# Patient Record
Sex: FEMALE | Race: WHITE | NOT HISPANIC OR LATINO | URBAN - METROPOLITAN AREA
[De-identification: names, ages, dates, MRNs, and addresses within clinical notes are randomized per-mention and may not be internally consistent; named-entity substitution may affect disease eponyms.]

---

## 2018-05-02 ENCOUNTER — EMERGENCY (EMERGENCY)
Facility: HOSPITAL | Age: 73
LOS: 1 days | Discharge: HOME | End: 2018-05-02
Attending: EMERGENCY MEDICINE
Payer: COMMERCIAL

## 2018-05-02 VITALS
SYSTOLIC BLOOD PRESSURE: 167 MMHG | RESPIRATION RATE: 22 BRPM | HEART RATE: 98 BPM | TEMPERATURE: 98 F | DIASTOLIC BLOOD PRESSURE: 79 MMHG | OXYGEN SATURATION: 100 % | WEIGHT: 139.99 LBS

## 2018-05-02 PROCEDURE — 99291 CRITICAL CARE FIRST HOUR: CPT

## 2018-05-02 PROCEDURE — 70496 CT ANGIOGRAPHY HEAD: CPT | Mod: 26

## 2018-05-02 PROCEDURE — 70498 CT ANGIOGRAPHY NECK: CPT | Mod: 26

## 2018-05-02 PROCEDURE — 70450 CT HEAD/BRAIN W/O DYE: CPT | Mod: 26,59

## 2018-05-02 RX ORDER — KETOROLAC TROMETHAMINE 30 MG/ML
15 SYRINGE (ML) INJECTION ONCE
Qty: 0 | Refills: 0 | Status: DISCONTINUED | OUTPATIENT
Start: 2018-05-02 | End: 2018-05-02

## 2018-05-02 RX ORDER — SODIUM CHLORIDE 9 MG/ML
1000 INJECTION INTRAMUSCULAR; INTRAVENOUS; SUBCUTANEOUS ONCE
Qty: 0 | Refills: 0 | Status: COMPLETED | OUTPATIENT
Start: 2018-05-02 | End: 2018-05-02

## 2018-05-02 RX ORDER — MORPHINE SULFATE 50 MG/1
4 CAPSULE, EXTENDED RELEASE ORAL ONCE
Qty: 0 | Refills: 0 | Status: DISCONTINUED | OUTPATIENT
Start: 2018-05-02 | End: 2018-05-02

## 2018-05-02 RX ORDER — ONDANSETRON 8 MG/1
4 TABLET, FILM COATED ORAL ONCE
Qty: 0 | Refills: 0 | Status: COMPLETED | OUTPATIENT
Start: 2018-05-02 | End: 2018-05-02

## 2018-05-02 RX ADMIN — ONDANSETRON 4 MILLIGRAM(S): 8 TABLET, FILM COATED ORAL at 23:52

## 2018-05-02 RX ADMIN — MORPHINE SULFATE 4 MILLIGRAM(S): 50 CAPSULE, EXTENDED RELEASE ORAL at 23:53

## 2018-05-02 NOTE — ED PROVIDER NOTE - OBJECTIVE STATEMENT
71 y/o F w/ hx of migraine HA p/w HAs today, consistent w/ prior migrane HAs, had multiple auros, had dysarthria, which resolved, associated w/ nausea.

## 2018-05-02 NOTE — ED PROVIDER NOTE - CRITICAL CARE PROVIDED
consultation with other physicians/additional history taking/documentation/interpretation of diagnostic studies/direct patient care (not related to procedure)

## 2018-05-02 NOTE — ED PROVIDER NOTE - ATTENDING CONTRIBUTION TO CARE
73 yo F presents with headache for the past 4-5 hours with slurred speech that started while in the ER. + expressive aphasia and dysarthria at this time. L frontal headache, 10/10 at this time. patient unable to describe if this was sudden onset or gradual onset. no neck pain or stiffness. no extremity weakness. patient has a h/o migraine and states that this is similar to her migraine headache, but this is the worst of her life. she reports an aura prior to migraine starting.  PE appears in distress secondary to pain. full rom of neck. + dysarthria, word finding difficulties with expressive aphasia. neuro exam otherwise normal.   code stroke called on pt arrival. 71 yo F presents with headache for the past 4-5 hours with slurred speech that started while in the ER. + expressive aphasia and dysarthria at this time. L frontal headache, 10/10 at this time. patient unable to describe if this was sudden onset or gradual onset. no neck pain or stiffness. no extremity weakness. patient has a h/o migraine and states that this is similar to her migraine headache, but this is the worst of her life. she reports an aura prior to headache starting today.  PE appears in distress secondary to pain. full rom of neck. + dysarthria, word finding difficulties with expressive aphasia. neuro exam otherwise normal.   code stroke called on pt arrival.  ed workup shows CTH with no acute infarct/bleed. 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  No intradural aneurysm  labs with hyponatremia 127 and hypokalemia 3.0. K repleted. patient treated with IVFs and pain meds in the Er with significant improvement of her headache. she remained neuro intact while in the ER. neuro recommended MRI brain to r/o migrainous infarct. patient transferred to CDU for further testing and tx

## 2018-05-02 NOTE — ED ADULT TRIAGE NOTE - NS ED TRIAGE CLINICAL UPGRADE PROVIDER FT
2320-Pts  came over to staff stating that pt was having sudden onset difficulty speaking/finding words, pt wheeled back to  and evaluated by MD Bolton who called code stroke at 2327

## 2018-05-03 VITALS
DIASTOLIC BLOOD PRESSURE: 67 MMHG | RESPIRATION RATE: 17 BRPM | OXYGEN SATURATION: 98 % | TEMPERATURE: 98 F | HEART RATE: 88 BPM | SYSTOLIC BLOOD PRESSURE: 109 MMHG

## 2018-05-03 LAB
ALBUMIN SERPL ELPH-MCNC: 4.4 G/DL — SIGNIFICANT CHANGE UP (ref 3.3–5)
ALBUMIN SERPL ELPH-MCNC: 4.7 G/DL — SIGNIFICANT CHANGE UP (ref 3.3–5)
ALP SERPL-CCNC: 61 U/L — SIGNIFICANT CHANGE UP (ref 40–120)
ALP SERPL-CCNC: 62 U/L — SIGNIFICANT CHANGE UP (ref 40–120)
ALT FLD-CCNC: 19 U/L — SIGNIFICANT CHANGE UP (ref 10–45)
ALT FLD-CCNC: 20 U/L — SIGNIFICANT CHANGE UP (ref 10–45)
ANION GAP SERPL CALC-SCNC: 11 MMOL/L — SIGNIFICANT CHANGE UP (ref 5–17)
ANION GAP SERPL CALC-SCNC: 13 MMOL/L — SIGNIFICANT CHANGE UP (ref 5–17)
ANION GAP SERPL CALC-SCNC: 20 MMOL/L — HIGH (ref 5–17)
APTT BLD: 23.8 SEC — LOW (ref 27.5–37.4)
AST SERPL-CCNC: 30 U/L — SIGNIFICANT CHANGE UP (ref 10–40)
AST SERPL-CCNC: 32 U/L — SIGNIFICANT CHANGE UP (ref 10–40)
BASOPHILS # BLD AUTO: 0.1 K/UL — SIGNIFICANT CHANGE UP (ref 0–0.2)
BILIRUB SERPL-MCNC: 0.3 MG/DL — SIGNIFICANT CHANGE UP (ref 0.2–1.2)
BILIRUB SERPL-MCNC: 0.3 MG/DL — SIGNIFICANT CHANGE UP (ref 0.2–1.2)
BUN SERPL-MCNC: 12 MG/DL — SIGNIFICANT CHANGE UP (ref 7–23)
BUN SERPL-MCNC: 8 MG/DL — SIGNIFICANT CHANGE UP (ref 7–23)
BUN SERPL-MCNC: 9 MG/DL — SIGNIFICANT CHANGE UP (ref 7–23)
CALCIUM SERPL-MCNC: 9.2 MG/DL — SIGNIFICANT CHANGE UP (ref 8.4–10.5)
CALCIUM SERPL-MCNC: 9.5 MG/DL — SIGNIFICANT CHANGE UP (ref 8.4–10.5)
CALCIUM SERPL-MCNC: 9.6 MG/DL — SIGNIFICANT CHANGE UP (ref 8.4–10.5)
CHLORIDE SERPL-SCNC: 88 MMOL/L — LOW (ref 96–108)
CHLORIDE SERPL-SCNC: 93 MMOL/L — LOW (ref 96–108)
CHLORIDE SERPL-SCNC: 95 MMOL/L — LOW (ref 96–108)
CO2 SERPL-SCNC: 19 MMOL/L — LOW (ref 22–31)
CO2 SERPL-SCNC: 23 MMOL/L — SIGNIFICANT CHANGE UP (ref 22–31)
CO2 SERPL-SCNC: 25 MMOL/L — SIGNIFICANT CHANGE UP (ref 22–31)
CREAT SERPL-MCNC: 0.64 MG/DL — SIGNIFICANT CHANGE UP (ref 0.5–1.3)
CREAT SERPL-MCNC: 0.64 MG/DL — SIGNIFICANT CHANGE UP (ref 0.5–1.3)
CREAT SERPL-MCNC: 0.68 MG/DL — SIGNIFICANT CHANGE UP (ref 0.5–1.3)
EOSINOPHIL # BLD AUTO: 0.1 K/UL — SIGNIFICANT CHANGE UP (ref 0–0.5)
EOSINOPHIL NFR BLD AUTO: 2 % — SIGNIFICANT CHANGE UP (ref 0–6)
GLUCOSE SERPL-MCNC: 140 MG/DL — HIGH (ref 70–99)
GLUCOSE SERPL-MCNC: 149 MG/DL — HIGH (ref 70–99)
GLUCOSE SERPL-MCNC: 158 MG/DL — HIGH (ref 70–99)
HCT VFR BLD CALC: 40.4 % — SIGNIFICANT CHANGE UP (ref 34.5–45)
HGB BLD-MCNC: 13.7 G/DL — SIGNIFICANT CHANGE UP (ref 11.5–15.5)
INR BLD: 0.89 RATIO — SIGNIFICANT CHANGE UP (ref 0.88–1.16)
LYMPHOCYTES # BLD AUTO: 38 % — SIGNIFICANT CHANGE UP (ref 13–44)
LYMPHOCYTES # BLD AUTO: 6.1 K/UL — HIGH (ref 1–3.3)
MCHC RBC-ENTMCNC: 31.3 PG — SIGNIFICANT CHANGE UP (ref 27–34)
MCHC RBC-ENTMCNC: 33.8 GM/DL — SIGNIFICANT CHANGE UP (ref 32–36)
MCV RBC AUTO: 92.6 FL — SIGNIFICANT CHANGE UP (ref 80–100)
MONOCYTES # BLD AUTO: 0.6 K/UL — SIGNIFICANT CHANGE UP (ref 0–0.9)
MONOCYTES NFR BLD AUTO: 8 % — SIGNIFICANT CHANGE UP (ref 2–14)
NEUTROPHILS # BLD AUTO: 4 K/UL — SIGNIFICANT CHANGE UP (ref 1.8–7.4)
NEUTROPHILS NFR BLD AUTO: 52 % — SIGNIFICANT CHANGE UP (ref 43–77)
PLATELET # BLD AUTO: 227 K/UL — SIGNIFICANT CHANGE UP (ref 150–400)
POTASSIUM SERPL-MCNC: 3 MMOL/L — LOW (ref 3.5–5.3)
POTASSIUM SERPL-MCNC: 4 MMOL/L — SIGNIFICANT CHANGE UP (ref 3.5–5.3)
POTASSIUM SERPL-MCNC: 4.1 MMOL/L — SIGNIFICANT CHANGE UP (ref 3.5–5.3)
POTASSIUM SERPL-SCNC: 3 MMOL/L — LOW (ref 3.5–5.3)
POTASSIUM SERPL-SCNC: 4 MMOL/L — SIGNIFICANT CHANGE UP (ref 3.5–5.3)
POTASSIUM SERPL-SCNC: 4.1 MMOL/L — SIGNIFICANT CHANGE UP (ref 3.5–5.3)
PROT SERPL-MCNC: 7.6 G/DL — SIGNIFICANT CHANGE UP (ref 6–8.3)
PROT SERPL-MCNC: 7.8 G/DL — SIGNIFICANT CHANGE UP (ref 6–8.3)
PROTHROM AB SERPL-ACNC: 9.7 SEC — LOW (ref 9.8–12.7)
RBC # BLD: 4.37 M/UL — SIGNIFICANT CHANGE UP (ref 3.8–5.2)
RBC # FLD: 10.8 % — SIGNIFICANT CHANGE UP (ref 10.3–14.5)
SODIUM SERPL-SCNC: 127 MMOL/L — LOW (ref 135–145)
SODIUM SERPL-SCNC: 129 MMOL/L — LOW (ref 135–145)
SODIUM SERPL-SCNC: 131 MMOL/L — LOW (ref 135–145)
TROPONIN T SERPL-MCNC: <0.01 NG/ML — SIGNIFICANT CHANGE UP (ref 0–0.06)
WBC # BLD: 10.8 K/UL — HIGH (ref 3.8–10.5)
WBC # FLD AUTO: 10.8 K/UL — HIGH (ref 3.8–10.5)

## 2018-05-03 PROCEDURE — 70496 CT ANGIOGRAPHY HEAD: CPT

## 2018-05-03 PROCEDURE — 99236 HOSP IP/OBS SAME DATE HI 85: CPT

## 2018-05-03 PROCEDURE — 70450 CT HEAD/BRAIN W/O DYE: CPT

## 2018-05-03 PROCEDURE — 82962 GLUCOSE BLOOD TEST: CPT

## 2018-05-03 PROCEDURE — 70498 CT ANGIOGRAPHY NECK: CPT

## 2018-05-03 PROCEDURE — 99291 CRITICAL CARE FIRST HOUR: CPT | Mod: 25

## 2018-05-03 PROCEDURE — G0378: CPT

## 2018-05-03 PROCEDURE — 96375 TX/PRO/DX INJ NEW DRUG ADDON: CPT | Mod: XU

## 2018-05-03 PROCEDURE — 85027 COMPLETE CBC AUTOMATED: CPT

## 2018-05-03 PROCEDURE — 80048 BASIC METABOLIC PNL TOTAL CA: CPT

## 2018-05-03 PROCEDURE — 85730 THROMBOPLASTIN TIME PARTIAL: CPT

## 2018-05-03 PROCEDURE — 96376 TX/PRO/DX INJ SAME DRUG ADON: CPT | Mod: XU

## 2018-05-03 PROCEDURE — 96374 THER/PROPH/DIAG INJ IV PUSH: CPT | Mod: XU

## 2018-05-03 PROCEDURE — 85610 PROTHROMBIN TIME: CPT

## 2018-05-03 PROCEDURE — 70551 MRI BRAIN STEM W/O DYE: CPT

## 2018-05-03 PROCEDURE — 93005 ELECTROCARDIOGRAM TRACING: CPT

## 2018-05-03 PROCEDURE — 70551 MRI BRAIN STEM W/O DYE: CPT | Mod: 26

## 2018-05-03 PROCEDURE — 84484 ASSAY OF TROPONIN QUANT: CPT

## 2018-05-03 PROCEDURE — 80053 COMPREHEN METABOLIC PANEL: CPT

## 2018-05-03 RX ORDER — SODIUM CHLORIDE 9 MG/ML
500 INJECTION INTRAMUSCULAR; INTRAVENOUS; SUBCUTANEOUS ONCE
Qty: 0 | Refills: 0 | Status: COMPLETED | OUTPATIENT
Start: 2018-05-03 | End: 2018-05-03

## 2018-05-03 RX ORDER — ACETAMINOPHEN 500 MG
1000 TABLET ORAL ONCE
Qty: 0 | Refills: 0 | Status: COMPLETED | OUTPATIENT
Start: 2018-05-03 | End: 2018-05-03

## 2018-05-03 RX ORDER — SODIUM CHLORIDE 9 MG/ML
1000 INJECTION INTRAMUSCULAR; INTRAVENOUS; SUBCUTANEOUS ONCE
Qty: 0 | Refills: 0 | Status: COMPLETED | OUTPATIENT
Start: 2018-05-03 | End: 2018-05-03

## 2018-05-03 RX ORDER — DEXAMETHASONE 0.5 MG/5ML
6 ELIXIR ORAL ONCE
Qty: 0 | Refills: 0 | Status: COMPLETED | OUTPATIENT
Start: 2018-05-03 | End: 2018-05-03

## 2018-05-03 RX ORDER — SODIUM CHLORIDE 9 MG/ML
3 INJECTION INTRAMUSCULAR; INTRAVENOUS; SUBCUTANEOUS EVERY 8 HOURS
Qty: 0 | Refills: 0 | Status: DISCONTINUED | OUTPATIENT
Start: 2018-05-03 | End: 2018-05-06

## 2018-05-03 RX ORDER — DULOXETINE HYDROCHLORIDE 30 MG/1
1 CAPSULE, DELAYED RELEASE ORAL
Qty: 0 | Refills: 0 | COMMUNITY

## 2018-05-03 RX ORDER — ONDANSETRON 8 MG/1
4 TABLET, FILM COATED ORAL ONCE
Qty: 0 | Refills: 0 | Status: COMPLETED | OUTPATIENT
Start: 2018-05-03 | End: 2018-05-03

## 2018-05-03 RX ORDER — DULOXETINE HYDROCHLORIDE 30 MG/1
60 CAPSULE, DELAYED RELEASE ORAL DAILY
Qty: 0 | Refills: 0 | Status: DISCONTINUED | OUTPATIENT
Start: 2018-05-03 | End: 2018-05-06

## 2018-05-03 RX ORDER — POTASSIUM CHLORIDE 20 MEQ
40 PACKET (EA) ORAL DAILY
Qty: 0 | Refills: 0 | Status: DISCONTINUED | OUTPATIENT
Start: 2018-05-03 | End: 2018-05-06

## 2018-05-03 RX ORDER — SODIUM CHLORIDE 9 MG/ML
1000 INJECTION, SOLUTION INTRAVENOUS
Qty: 0 | Refills: 0 | Status: DISCONTINUED | OUTPATIENT
Start: 2018-05-03 | End: 2018-05-03

## 2018-05-03 RX ORDER — METOCLOPRAMIDE HCL 10 MG
10 TABLET ORAL ONCE
Qty: 0 | Refills: 0 | Status: COMPLETED | OUTPATIENT
Start: 2018-05-03 | End: 2018-05-03

## 2018-05-03 RX ADMIN — Medication 40 MILLIEQUIVALENT(S): at 02:22

## 2018-05-03 RX ADMIN — Medication 1000 MILLIGRAM(S): at 03:47

## 2018-05-03 RX ADMIN — Medication 400 MILLIGRAM(S): at 03:10

## 2018-05-03 RX ADMIN — SODIUM CHLORIDE 3 MILLILITER(S): 9 INJECTION INTRAMUSCULAR; INTRAVENOUS; SUBCUTANEOUS at 06:45

## 2018-05-03 RX ADMIN — Medication 6 MILLIGRAM(S): at 00:23

## 2018-05-03 RX ADMIN — SODIUM CHLORIDE 100 MILLILITER(S): 9 INJECTION, SOLUTION INTRAVENOUS at 02:44

## 2018-05-03 RX ADMIN — Medication 15 MILLIGRAM(S): at 00:13

## 2018-05-03 RX ADMIN — ONDANSETRON 4 MILLIGRAM(S): 8 TABLET, FILM COATED ORAL at 02:43

## 2018-05-03 RX ADMIN — Medication 10 MILLIGRAM(S): at 00:13

## 2018-05-03 RX ADMIN — SODIUM CHLORIDE 250 MILLILITER(S): 9 INJECTION INTRAMUSCULAR; INTRAVENOUS; SUBCUTANEOUS at 08:00

## 2018-05-03 RX ADMIN — SODIUM CHLORIDE 1000 MILLILITER(S): 9 INJECTION INTRAMUSCULAR; INTRAVENOUS; SUBCUTANEOUS at 00:13

## 2018-05-03 RX ADMIN — SODIUM CHLORIDE 1000 MILLILITER(S): 9 INJECTION INTRAMUSCULAR; INTRAVENOUS; SUBCUTANEOUS at 00:25

## 2018-05-03 NOTE — ED ADULT NURSE NOTE - OBJECTIVE STATEMENT
72y female with hx of migraines presents to the ER c/o headaches. Pt is alert and oriented x 4 and speaking coherently. Pt states that today she had an onset of a typical migraine around 1 pm. Pt states the pain worsened through out the day and became unbearable tonight. in the ER pt is diaphoretic and c/o headache, nausea, and dizziness. Pt became intermittently aphasic and a code stroke was called in ER. Pt denies cp, sob, n/v/d. pupils reactive. Pt appears uncomfortable. Pt brought to CT from triage. Pt on CM. Pt ambulating after IVF- gait steady. Pt  at the bedside. Neuro MD herminio silverio. will reassess.

## 2018-05-03 NOTE — ED CDU PROVIDER DISPOSITION NOTE - ATTENDING CONTRIBUTION TO CARE
I , Dr Bladimir Chawla has seen and evaluated the patient.  The patient reports improvement in symptoms.CTA findings adressed [facility] pt will marek lane   The patient is stable for discharge home and will follow up with their primary physiacian.

## 2018-05-03 NOTE — ED CDU PROVIDER DISPOSITION NOTE - CLINICAL COURSE
73 y/o F w/ PMH of migraines and depression p/w HA today. pt states H/A is L sided, severe 10/10 pain. states she had 3 auras of decreased vision prior to H/A. Aleve-D did not help. symptoms are consistent w/ prior migrane HAs, except that normally she only has one episode of decreased vison prior to H/A, not multiple auras. States H/A associated w/ nausea and lightheadedness. denies any speech changes, neck pain, CP, back pain, SOB, tremor, weakness, paresthesias, vomiting, abd pain, problems walking.   	In ED, code stroke was called, neuro saw pt, CTH negative, neuro d/c'd code stroke as symptoms are 2/2 migraine. 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 CTA Neck showed atherosclerotic calcifications in carotid bulbs b/l. CTA results discussed with neuro, no tx needed now, can f/u results outpt. Pt sent to CDU for pain control and MRI head to r/o migrainous infarcts. MRI ______________. 73 y/o F w/ PMH of migraines and depression p/w HA today. pt states H/A is L sided, severe 10/10 pain. states she had 3 auras of decreased vision prior to H/A. Aleve-D did not help. symptoms are consistent w/ prior migrane HAs, except that normally she only has one episode of decreased vison prior to H/A, not multiple auras. States H/A associated w/ nausea and lightheadedness. denies any speech changes, neck pain, CP, back pain, SOB, tremor, weakness, paresthesias, vomiting, abd pain, problems walking.   	In ED, code stroke was called, neuro saw pt, CTH negative, neuro d/c'd code stroke as symptoms are 2/2 migraine. 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 CTA Neck showed atherosclerotic calcifications in carotid bulbs b/l. CTA results discussed with neuro, no tx needed now, can f/u results outpt. Pt sent to CDU for pain control and MRI head to r/o migrainous infarcts. MRI negative for infarct. Patient evaluated at bedside by neuro attending Dr. Gloria who recommended patient safe to be discharged home with neurology follow up. Repeated patient's BMP which was within normal limits. Patient safe to be discharged home per ED attending Dr. Chawla

## 2018-05-03 NOTE — CONSULT NOTE ADULT - SUBJECTIVE AND OBJECTIVE BOX
Neurology consult    DOUGLAS [id number]     Patient is a 72y old  Female who presents with a chief complaint of HA    HPI:  72 F PMH chronic migraines with visual auras on no meds presents a sa code Stroke. Patient endorses severe left frontal HA since 1 this afternoon with fluctuating auras. In the Ed Code stroke was called as patient developed dysarthria since resolved. Patient endorses N/V.. Denies  vision loss or double vision.  No dizziness, vertigo or new hearing loss.  . No difficulty with swallowing.  No focal weakness.  No focal sensory changes.  No numbness or tingling in the bilateral lower extremities.  No difficulty with balance.  No difficulty with ambulation.      REVIEW OF SYSTEMS:    Constitutional: No fever, chills, fatigue, weakness  Eyes: no eye pain, visual disturbances, or discharge  ENT:  No difficulty hearing, tinnitus, vertigo; No sinus or throat pain  Neck: No pain or stiffness  Respiratory: No cough, dyspnea, wheezing   Cardiovascular: No chest pain, palpitations,   Gastrointestinal: No abdominal or epigastric pain. No nausea, vomiting  No diarrhea or constipation.   Genitourinary: No dysuria, frequency, hematuria or incontinence  Neurological: see HPI  Psychiatric: No depression, anxiety, mood swings or difficulty sleeping  Musculoskeletal: No joint pain or swelling; No muscle, back or extremity pain  Skin: No itching, burning, rashes or lesions   Lymph Nodes: No enlarged glands  Endocrine: No heat or cold intolerance; No hair loss, No h/o diabetes or thyroid dysfunction  Allergy and Immunologic: No hives or eczema    MEDICATIONS    dexamethasone  Injectable 6 milliGRAM(s) IV Push Once      PMH:      PSH:     Family history: No history of dementia, strokes, or seizures   FAMILY HISTORY:      SOCIAL HISTORY:  No history of tobacco or alcohol use     Allergies    penicillins (Hives)    Intolerances          Weight (kg): 63.5 (05-02 @ 23:04)    Vital Signs Last 24 Hrs  T(C): 36.4 (02 May 2018 23:04), Max: 36.4 (02 May 2018 23:04)  T(F): 97.5 (02 May 2018 23:04), Max: 97.5 (02 May 2018 23:04)  HR: 98 (02 May 2018 23:04) (98 - 98)  BP: 167/79 (02 May 2018 23:04) (167/79 - 167/79)  BP(mean): --  RR: 22 (02 May 2018 23:04) (22 - 22)  SpO2: 100% (02 May 2018 23:04) (100% - 100%)      On Neurological Examination:    Mental Status - Patient is alert, awake, oriented X3. fluent, names, no dysarthria no aphasia Follows commands well an    Cranial Nerves - PERRL, EOMI, visual fields full with Right visual extinction and ? Right lateral visual cut, V1-V3 intact, no gross facial asymmetry, tongue/uvula midline    Motor Exam -   no drift in all extremetied    Sensory    Intact to light touch and pinprick bilaterally    Coord: FTN intact bilaterally     Gait -  deferred                                         GENERAL Exam:    mild Distress   	  HEENT:  normocephalic, atraumatic  		  LUNGS:	Clear bilaterally  No Wheeze    	  HEART:	 Normal S1S2   No murmur RRR        	  GI/ ABDOMEN:  Soft  Non tender    EXTREMITIES:   No Edema  No Clubbing  No Cyanosis No Edema    MUSCULOSKELETAL: Normal Range of Motion  	   SKIN:      Normal   No Ecchymosis               LABS:  CBC Full  -  ( 02 May 2018 23:30 )  WBC Count : 10.8 K/uL  Hemoglobin : 13.7 g/dL  Hematocrit : 40.4 %  Platelet Count - Automated : 227 K/uL  Mean Cell Volume : 92.6 fl  Mean Cell Hemoglobin : 31.3 pg  Mean Cell Hemoglobin Concentration : 33.8 gm/dL  Auto Neutrophil # : x  Auto Lymphocyte # : x  Auto Monocyte # : x  Auto Eosinophil # : x  Auto Basophil # : x  Auto Neutrophil % : x  Auto Lymphocyte % : x  Auto Monocyte % : x  Auto Eosinophil % : x  Auto Basophil % : x      05-02    127<L>  |  88<L>  |  12  ----------------------------<  149<H>  3.0<L>   |  19<L>  |  0.68    Ca    9.5      02 May 2018 23:30    TPro  7.8  /  Alb  4.7  /  TBili  0.3  /  DBili  x   /  AST  32  /  ALT  20  /  AlkPhos  61  05-02    LIVER FUNCTIONS - ( 02 May 2018 23:30 )  Alb: 4.7 g/dL / Pro: 7.8 g/dL / ALK PHOS: 61 U/L / ALT: 20 U/L / AST: 32 U/L / GGT: x           Hemoglobin A1C:       PT/INR - ( 02 May 2018 23:30 )   PT: 9.7 sec;   INR: 0.89 ratio         PTT - ( 02 May 2018 23:30 )  PTT:23.8 sec      RADIOLOGY  < from: CT Brain Stroke Protocol (05.02.18 @ 23:45) >  No CT evidence of acute intracranial hemorrhage, brain edema, mass effect   or acute territorial infarct. Follow-up MRI can be obtained as clinically   warranted.     Dr. Chacon discussed thefindings with nurse practitioner Allison from   neurology on 5/2/2018 at 11:46 PM.  Read back verification was obtained.      < end of copied text >

## 2018-05-03 NOTE — ED CDU PROVIDER INITIAL DAY NOTE - ATTENDING CONTRIBUTION TO CARE
71 yo F presents with headache for the past 4-5 hours with slurred speech that started while in the ER. + expressive aphasia and dysarthria at this time. L frontal headache, 10/10 at this time. patient unable to describe if this was sudden onset or gradual onset. no neck pain or stiffness. no extremity weakness. patient has a h/o migraine and states that this is similar to her migraine headache, but this is the worst of her life. she reports an aura prior to headache starting today.  PE appears in distress secondary to pain. full rom of neck. + dysarthria, word finding difficulties with expressive aphasia. neuro exam otherwise normal.   code stroke called on pt arrival.  ed workup shows CTH with no acute infarct/bleed. 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  No intradural aneurysm  labs with hyponatremia 127 and hypokalemia 3.0. K repleted. patient treated with IVFs and pain meds in the Er with significant improvement of her headache. she remained neuro intact while in the ER. neuro recommended MRI brain to r/o migrainous infarct. patient transferred to CDU for further testing and tx

## 2018-05-03 NOTE — ED ADULT NURSE NOTE - NS ED TRIAGE CLINICAL UPGRADE PROVIDER FT
2320-Pts  came over to staff stating that pt was having sudden onset difficulty speaking/finding words, pt wheeled back to  and evaluated by MD Bolton who called code stroke at 2324

## 2018-05-03 NOTE — ED CDU PROVIDER DISPOSITION NOTE - PLAN OF CARE
1. Continue your home medications as directed.  2. Drink plenty of fluids to stay hydrated.  3. You will need to follow up with your PMD and neurologist or our neurology clinic at [phone] in 2-3 days. A copy of your results were given to you to bring to your appt.   4. Return to ER for headache, confusion, behavior/speech changes, numbness/tingling/weakness in your arms/legs, or any other concerns. 1. Continue your home medications as directed.  2. Drink plenty of fluids to stay hydrated.  3. You will need to follow up with your PMD and neurologist or our neurology clinic Dr. Brian Felix at [phone] in 2-3 days. A copy of your results were given to you to bring to your appt.   4. Return to ER for headache, confusion, behavior/speech changes, numbness/tingling/weakness in your arms/legs, or any other concerns.

## 2018-05-03 NOTE — CONSULT NOTE ADULT - ASSESSMENT
72 F PMh Migraine presents with severe migraine with episodes of aphasia and dysarthria. Right visual extinction on exam CTH, CTA- NIHSS 1    S/p migraine cocktail in Ed with symptomatic improvemet  Given age and episodes of Neuro deficits, consider CDU for MRI w/o con to r/o migrainous infarct

## 2018-05-03 NOTE — ED CDU PROVIDER INITIAL DAY NOTE - PROGRESS NOTE DETAILS
CDU progress note BABAK Nettles: Pt c/o 6/10 H/A earlier, IV tylenol given, H/A improved. patient sleeping comfortably now. NAD. VSS. will continue monitoring. CDU BABAK Marr: Patient was at MRI when initially went to assess her at 8:30AM. Patient seen at bedside in NAD.  VSS.  Patient resting comfortably without complaints. Patient pending MRI results and neuro re-eval. Will give NS bolus to improve sodium and repeat BMP BABAK Marr: Patient seen by neurology attending Dr. Brian Gloria at bedside who reported MRI does not show any abnormalities. Patient safe to be discharged with neuro follow up. Patient completed erica velazquez now nml ha resolved awaitnig mri resuts

## 2018-05-03 NOTE — CONSULT NOTE ADULT - ATTENDING COMMENTS
VASCULAR NEUROLOGY ATTENDING  The patient is seen and examined the history and imaging are reviewed. I agree with the resident note unless otherwise noted.

## 2018-05-03 NOTE — ED CDU PROVIDER INITIAL DAY NOTE - DETAILS
- pain control  - vitals  - MRI head   - neuro following  - case discussed with attending Dr. Bolton

## 2018-05-03 NOTE — ED CDU PROVIDER INITIAL DAY NOTE - OBJECTIVE STATEMENT
73 y/o F w/ PMH of migraines and depression p/w HA today. pt states H/A is L sided, severe 10/10 pain. states she had 3 auras of decreased vision prior to H/A. Aleve-D did not help. symptoms are consistent w/ prior migrane HAs, except that normally she only has one episode of decreased vison prior to H/A, not multiple auras. States H/A associated w/ nausea and lightheadedness. denies any speech changes, neck pain, CP, back pain, SOB, tremor, weakness, paresthesias, vomiting, abd pain, problems walking.   In ED, code stroke was called, neuro saw pt, CTH negative, neuro d/c'd code stroke as symptoms are 2/2 migraine. 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 CTA Neck showed atherosclerotic calcifications in carotid bulbs b/l. CTA results discussed with neuro, no tx needed now, can f/u results outpt. Pt sent to CDU for pain control and MRI head to r/o migrainous infarcts.     PMD Dr. Haider [phone] 73 y/o F w/ PMH of migraines and depression p/w HA today. pt states H/A is L sided, severe 10/10 pain. states she had 3 auras of decreased vision prior to H/A. Aleve-D did not help. symptoms are consistent w/ prior migrane HAs, except that normally she only has one episode of decreased vison prior to H/A, not multiple auras. States H/A associated w/ nausea and lightheadedness. denies any speech changes, neck pain, CP, back pain, SOB, tremor, weakness, paresthesias, vomiting, abd pain, problems walking.   In ED, code stroke was called, neuro saw pt, CTH negative, neuro d/c'd code stroke as symptoms are 2/2 migraine. 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 CTA Neck showed atherosclerotic calcifications in carotid bulbs b/l. CTA results discussed with neuro, no tx needed now, can f/u results outpt. Pt sent to CDU for pain control and MRI head to r/o migrainous infarcts. H/A decreased to 5/10 after getting medication in ED; doesn't want anything else for pain at this time.     PMD Dr. Haider [phone]

## 2019-01-22 ENCOUNTER — RESULT REVIEW (OUTPATIENT)
Age: 74
End: 2019-01-22

## 2019-10-07 PROBLEM — F32.9 MAJOR DEPRESSIVE DISORDER, SINGLE EPISODE, UNSPECIFIED: Chronic | Status: ACTIVE | Noted: 2018-05-03

## 2019-10-07 PROBLEM — G43.909 MIGRAINE, UNSPECIFIED, NOT INTRACTABLE, WITHOUT STATUS MIGRAINOSUS: Chronic | Status: ACTIVE | Noted: 2018-05-03

## 2019-10-28 ENCOUNTER — APPOINTMENT (OUTPATIENT)
Dept: ORTHOPEDIC SURGERY | Facility: CLINIC | Age: 74
End: 2019-10-28

## 2019-11-12 NOTE — ED ADULT TRIAGE NOTE - RESPIRATORY RATE (BREATHS/MIN)
Occupational Therapy Daily Treatment    Visit Count: 7  Plan of Care: 9/9/2019 Through: 12/3/2019  Insurance Information: occupational therapy cap of $2040 per calendar year  Precautions:   • Sprain of ankle, unspecified site   • High risk medication use   • Cervical radicular pain   • Back muscle spasm   RTC surgery R shoulder 20 years ago  Hx of gout in left elbow      MRI was reviewed showing a.c. arthritis and mild partial thickness tearing and tendinosis of the supraspinatus tendon   IMPRESSION:      1.  Rotator cuff tendinopathy and up to high-grade partial-thickness tears,  most prominent in the distal supraspinatus.  2.  Findings suspicious for possible degenerative type labral tears and/or  fraying. The posterior labrum demonstrates a focus of increased fluid  signal possibly representing a small tear or fissure. Mild glenohumeral  chondromalacia.  3.  Acromioclavicular osteoarthritis. Associated narrowing of the  subacromial outlet.     SUBJECTIVE   Description of Problem and/or Mechanism of Injury:    Pt has been dealing with shoulder pain for about 7 months now. MRI shows high grade partial supraspinatus tearing, A/C osteoarthritis and possible labral tearing. Pt reported \"I'm ready to get a shot and get on with my life\"    SUBJECTIVE   Pt has been doing exercises 1x/day; everything's getting easier; pt feels 90% better since start of therapy     Current Pain (0-10 scale): 3 /10 (was 4)  Functional Change: Combing hair is improving   9/24: Improvement with reaching, can push up with affected arm  10/15: combing hair is improving   11/12: tucking in shirt, improvement with reaching  OBJECTIVE     Range of Motion (degrees)    Left Right Left Left Left Left   Date Initial Initial 9/24 10/1 10/15 11/5   Shoulder Flexion (170-180) 102 A*  115 AA* 150 A 125 A* 140  A 150 A   Shoulder Extension (50-60)           Shoulder Abduction (170-180) 76 A-S    150 A-S 125 A*  145 A 155 A   Shoulder Adduction  (50-75)           Shoulder Internal Rotation () Lateral hip Low-mid back buttocks  buttocks Low back   Shoulder External Rotation (80-90) 60 at neutral  48 at 25 ABD* 90 at neutral 70 at 90 abd  80 A 85 A   Reported in degrees, active range of motion recorded unless noted as AA=active assistive or P=passive; standard testing positions unless otherwise noted, norms included in ( ); *=pain         9/17-Cervical Screen:Decreased cervical motion lateral flexion and rotation bilaterally       Treatment   Manual Therapy:  Positioned in supine, the patient received soft tissue mobilization to the pecs and UT with moderate, sustained pressure to trigger points and cross friction technique performed at bicipital groove to increase soft tissue mobility.      Neuromuscular re-education:  ABC's on wall-large letters all completed-- pt noting muscular fatigue    -Supine eccentric work- focusing on proper scapular kinematics. UT compensation noted along with decreased periscapular strength along with pain with eccentric lowering  Flexion to 90 x 10- improved tolerance and less pain with eccentric control  Scaption to 90 x 10- improved tolerance and less pain with eccentric control    Circles x 10 (clockwise and counterclockwise)    With patient in right sidelying positioning, therapist assisted with all movements including proper rotation, setting, and eccentric control of scapula and isolating the periscapular musculature. Patient completed 2 sets of 10 of the following:  -shoulder flexion, no weight  -rhythmic stabilization at 90 degrees of abduction, no weight  -scapular protracition to retraction, no weight   - Figure 8 x 20  -Scapular mobilizations completed to improve scapulohumeral rhythm and shoulder kinematics.     Therapeutic Exercise:  Closed chain ball exercises in scaption and flexion on table as a warmup  Min-moderate tactile and verbal cues needed to perform all exercises accurately.   Cane serratus punches x  10- Mod cues for proper form    Gentle wall slides x 5 with 10 second- improvement noted - up to 120 degrees (was 90)    Cane IR x 5*  Cane Extension x 10*     Upgraded to  level 2 theraband exercises for bilateral shoulders, pt completed 1 sets of 10 repetitions of:*  -scapular retraction/depression with emphasis on not macarena the upper trapezius- upgrade to orange  -T band extension*- yellow  -external rotation-bilaterally- upgraded to level 1  *tactile and verbal feedback provided on posture, engagement of the core, and position of the pelvis*    Skilled input: verbal instruction/cues, tactile instruction/cues  HOME PROGRAM  Access Code: [email or web address]   URL: [email or web address]   Date: 11/12/2019   Prepared by: Yobani Thakkar     Program Notes   [phone]  Check out [employer] [facility] for meditation    Exercises   Supine Shoulder Protraction with Dowel - 5-10 reps - 1-2 sets - 5 hold - 2x daily   Shoulder External Rotation and Scapular Retraction with Resistance - 5-10 reps - 1-2 sets - 5 hold - 2x daily   Shoulder Extension with Resistance - 5-10 reps - 1-2 sets - 5 hold - 1x daily - 3x weekly   Standing Shoulder Row with Anchored Resistance - 5-10 reps - 1-2 sets - 5 hold - 3x weekly   Shoulder Flexion Wall Slide with Towel - 5-10 reps - 1-2 sets - 5 hold - 2x daily   Standing Shoulder Internal Rotation AAROM with Dowel - 5-10 reps - 1-2 sets - 5 hold - 2x daily   Standing Shoulder Extension with Dowel - 5-10 reps - 1-2 sets - 5 hold - 2x daily   Patient Education   Office Posture    Skilled input: verbal instruction/cues, tactile instruction/cues      Writer verbally educated the patient and received verbal consent from the patient on hand placement, positioning of patient, and techniques to be performed today including clothing adjustments for techniques, therapist position for techniques as described above and how they are pertinent to the patient's plan of care.       Suggestions for next  session as indicated: progress per plan of care,   Add T band extension, modified prone exercises  place/ hold and more AROM in supine (pain with eccentric control last time()    painful arc/ impingement symptoms     ASSESSMENT   Significant improvement in AROM along with improved scapular stability along with eccentric control. Pt would benefit from 1-2 more sessions to review and finalize HEP and to ensure compliance and continual improvement.       Supine eccentric work -- improved tolerance and less pain with eccentric control  Pain after treatment (patient reported, 0-10 scale): same  Result of above outlined education: Verbalizes understanding and Needs reinforcement    THERAPY DAILY BILLING   Insurance: MEDICARE 2. [employer]    Evaluation Procedures:  No evaluation codes were used on this date of service    Timed Procedures:  Manual Therapy, 10 minutes  Neuromuscular Re-Education, 14 minutes  Therapeutic Exercise, 30 minutes    Untimed Procedures:      Total Treatment Time: 54 minutes     22

## 2022-04-12 ENCOUNTER — APPOINTMENT (OUTPATIENT)
Dept: OBGYN | Facility: CLINIC | Age: 77
End: 2022-04-12
Payer: MEDICARE

## 2022-04-12 VITALS
BODY MASS INDEX: 26.4 KG/M2 | SYSTOLIC BLOOD PRESSURE: 135 MMHG | HEIGHT: 63 IN | WEIGHT: 149 LBS | DIASTOLIC BLOOD PRESSURE: 81 MMHG

## 2022-04-12 DIAGNOSIS — Z00.00 ENCOUNTER FOR GENERAL ADULT MEDICAL EXAMINATION W/OUT ABNORMAL FINDINGS: ICD-10-CM

## 2022-04-12 PROCEDURE — G0328 FECAL BLOOD SCRN IMMUNOASSAY: CPT | Mod: QW

## 2022-04-12 PROCEDURE — G0101: CPT

## 2022-04-13 LAB — HPV HIGH+LOW RISK DNA PNL CVX: NOT DETECTED

## 2022-04-14 ENCOUNTER — RESULT REVIEW (OUTPATIENT)
Age: 77
End: 2022-04-14

## 2022-04-14 ENCOUNTER — APPOINTMENT (OUTPATIENT)
Dept: ULTRASOUND IMAGING | Facility: CLINIC | Age: 77
End: 2022-04-14
Payer: MEDICARE

## 2022-04-14 ENCOUNTER — APPOINTMENT (OUTPATIENT)
Dept: MAMMOGRAPHY | Facility: CLINIC | Age: 77
End: 2022-04-14
Payer: MEDICARE

## 2022-04-14 DIAGNOSIS — R92.2 INCONCLUSIVE MAMMOGRAM: ICD-10-CM

## 2022-04-14 PROCEDURE — 76641 ULTRASOUND BREAST COMPLETE: CPT | Mod: 50

## 2022-04-14 PROCEDURE — 77063 BREAST TOMOSYNTHESIS BI: CPT

## 2022-04-14 PROCEDURE — 77067 SCR MAMMO BI INCL CAD: CPT

## 2022-04-17 LAB — CYTOLOGY CVX/VAG DOC THIN PREP: ABNORMAL

## 2022-05-19 ENCOUNTER — TRANSCRIPTION ENCOUNTER (OUTPATIENT)
Age: 77
End: 2022-05-19

## 2022-12-22 ENCOUNTER — APPOINTMENT (OUTPATIENT)
Dept: ORTHOPEDIC SURGERY | Facility: CLINIC | Age: 77
End: 2022-12-22

## 2022-12-22 VITALS — BODY MASS INDEX: 26.22 KG/M2 | HEIGHT: 63 IN | WEIGHT: 148 LBS

## 2022-12-22 DIAGNOSIS — M17.12 UNILATERAL PRIMARY OSTEOARTHRITIS, LEFT KNEE: ICD-10-CM

## 2022-12-22 PROCEDURE — 99204 OFFICE O/P NEW MOD 45 MIN: CPT

## 2022-12-22 NOTE — HISTORY OF PRESENT ILLNESS
[Gradual] : gradual [10] : 10 [9] : 9 [Dull/Aching] : dull/aching [Tightness] : tightness [Squeezing] : squeezing [Constant] : constant [Household chores] : household chores [Leisure] : leisure [Nothing helps with pain getting better] : Nothing helps with pain getting better [de-identified] : 78 y/o F presenting with b/l knee pain. States the L knee has been in pain X 1 week. Had had chronic knee pain. Had CSI given one day prior, no relieve. Has had previous CSI to R knee. Has not had the gel injections. Has been taking advil with some relieve. Was given meloxicam from her rheumatologist. Had a fall in September.  [] : Post Surgical Visit: no [FreeTextEntry1] : bonny knees [FreeTextEntry5] : pt has ongoing pain in both knees [FreeTextEntry7] : left calf [de-identified] : motion/pressure [de-identified] : xray

## 2022-12-22 NOTE — IMAGING
[de-identified] : Knee Exam\par Alignment: Neutral \par Effusion: None\par Atrophy: None                                                \par Stable to Varus/valgus stress\par Posterior Drawer Test: negative\par Anterior Drawer Test: Negative\par Knee Extension/Flexion: 0 / 120\par \par Medial/lateral compartments\par Medial joint line: No Tenderness\par Lateral joint line: No Tenderness\par Jem test: negative\par \par Patellofemoral joint\par Medial patellar facet: no tenderness\par Patellar grind: Negative\par \par Tendons:\par Pes Anserine: No tenderness\par Gerdy’s Tubercle/ IT Band: No tenderness\par Quadriceps Tendon: No Tenderness\par patellar tendon: no Tenderness\par Tibial tubercle: not tenderness\par Calf: no Tenderness\par \par Neurovascular exam\par Muscle strength: 5/5\par Sensation to light touch: intact\par Distal pulses: 2+\par \par IMAGING: \par 12/22/22:

## 2022-12-22 NOTE — ASSESSMENT
[FreeTextEntry1] : 77 year F with bilateral knee OA\par - physical therapy, NSAIDs. CSI 2 days ago\par - follow up as needed for potential gel injections

## 2023-04-07 DIAGNOSIS — Z12.39 ENCOUNTER FOR OTHER SCREENING FOR MALIGNANT NEOPLASM OF BREAST: ICD-10-CM

## 2023-04-27 ENCOUNTER — APPOINTMENT (OUTPATIENT)
Dept: MAMMOGRAPHY | Facility: CLINIC | Age: 78
End: 2023-04-27
Payer: MEDICARE

## 2023-04-27 ENCOUNTER — RESULT REVIEW (OUTPATIENT)
Age: 78
End: 2023-04-27

## 2023-04-27 ENCOUNTER — NON-APPOINTMENT (OUTPATIENT)
Age: 78
End: 2023-04-27

## 2023-04-27 ENCOUNTER — APPOINTMENT (OUTPATIENT)
Dept: RADIOLOGY | Facility: CLINIC | Age: 78
End: 2023-04-27
Payer: MEDICARE

## 2023-04-27 ENCOUNTER — APPOINTMENT (OUTPATIENT)
Dept: ULTRASOUND IMAGING | Facility: CLINIC | Age: 78
End: 2023-04-27
Payer: MEDICARE

## 2023-04-27 PROCEDURE — 76641 ULTRASOUND BREAST COMPLETE: CPT | Mod: 50,GA

## 2023-04-27 PROCEDURE — 77067 SCR MAMMO BI INCL CAD: CPT

## 2023-04-27 PROCEDURE — 77080 DXA BONE DENSITY AXIAL: CPT

## 2023-04-27 PROCEDURE — 77063 BREAST TOMOSYNTHESIS BI: CPT

## 2023-05-03 ENCOUNTER — RESULT REVIEW (OUTPATIENT)
Age: 78
End: 2023-05-03

## 2023-05-03 ENCOUNTER — APPOINTMENT (OUTPATIENT)
Dept: MAMMOGRAPHY | Facility: CLINIC | Age: 78
End: 2023-05-03
Payer: MEDICARE

## 2023-05-03 ENCOUNTER — APPOINTMENT (OUTPATIENT)
Dept: ULTRASOUND IMAGING | Facility: CLINIC | Age: 78
End: 2023-05-03
Payer: MEDICARE

## 2023-05-03 ENCOUNTER — OUTPATIENT (OUTPATIENT)
Dept: OUTPATIENT SERVICES | Facility: HOSPITAL | Age: 78
LOS: 1 days | End: 2023-05-03
Payer: MEDICARE

## 2023-05-03 DIAGNOSIS — Z12.31 ENCOUNTER FOR SCREENING MAMMOGRAM FOR MALIGNANT NEOPLASM OF BREAST: ICD-10-CM

## 2023-05-03 PROCEDURE — 76642 ULTRASOUND BREAST LIMITED: CPT | Mod: 26,LT

## 2023-05-03 PROCEDURE — G0279: CPT | Mod: 26

## 2023-05-03 PROCEDURE — 76642 ULTRASOUND BREAST LIMITED: CPT

## 2023-05-03 PROCEDURE — 77065 DX MAMMO INCL CAD UNI: CPT | Mod: 26,LT

## 2023-05-03 PROCEDURE — 77065 DX MAMMO INCL CAD UNI: CPT

## 2023-05-03 PROCEDURE — G0279: CPT

## 2023-05-04 ENCOUNTER — NON-APPOINTMENT (OUTPATIENT)
Age: 78
End: 2023-05-04

## 2023-05-04 DIAGNOSIS — N64.89 OTHER SPECIFIED DISORDERS OF BREAST: ICD-10-CM

## 2023-05-09 ENCOUNTER — APPOINTMENT (OUTPATIENT)
Dept: MAMMOGRAPHY | Facility: CLINIC | Age: 78
End: 2023-05-09
Payer: MEDICARE

## 2023-05-09 ENCOUNTER — RESULT REVIEW (OUTPATIENT)
Age: 78
End: 2023-05-09

## 2023-05-09 ENCOUNTER — OUTPATIENT (OUTPATIENT)
Dept: OUTPATIENT SERVICES | Facility: HOSPITAL | Age: 78
LOS: 1 days | End: 2023-05-09
Payer: MEDICARE

## 2023-05-09 DIAGNOSIS — N64.89 OTHER SPECIFIED DISORDERS OF BREAST: ICD-10-CM

## 2023-05-09 PROCEDURE — 19081 BX BREAST 1ST LESION STRTCTC: CPT | Mod: LT

## 2023-05-09 PROCEDURE — 88305 TISSUE EXAM BY PATHOLOGIST: CPT

## 2023-05-09 PROCEDURE — 77065 DX MAMMO INCL CAD UNI: CPT | Mod: 26,LT

## 2023-05-09 PROCEDURE — 19081 BX BREAST 1ST LESION STRTCTC: CPT

## 2023-05-09 PROCEDURE — A4648: CPT

## 2023-05-09 PROCEDURE — 88305 TISSUE EXAM BY PATHOLOGIST: CPT | Mod: 26

## 2023-05-09 PROCEDURE — 77065 DX MAMMO INCL CAD UNI: CPT

## 2023-05-15 LAB — SURGICAL PATHOLOGY STUDY: SIGNIFICANT CHANGE UP

## 2023-06-20 ENCOUNTER — APPOINTMENT (OUTPATIENT)
Dept: OBGYN | Facility: CLINIC | Age: 78
End: 2023-06-20
Payer: MEDICARE

## 2023-06-20 VITALS
WEIGHT: 147 LBS | SYSTOLIC BLOOD PRESSURE: 132 MMHG | DIASTOLIC BLOOD PRESSURE: 82 MMHG | BODY MASS INDEX: 26.05 KG/M2 | HEIGHT: 63 IN

## 2023-06-20 DIAGNOSIS — Z01.411 ENCOUNTER FOR GYNECOLOGICAL EXAMINATION (GENERAL) (ROUTINE) WITH ABNORMAL FINDINGS: ICD-10-CM

## 2023-06-20 DIAGNOSIS — M85.80 OTHER SPECIFIED DISORDERS OF BONE DENSITY AND STRUCTURE, UNSPECIFIED SITE: ICD-10-CM

## 2023-06-20 DIAGNOSIS — N90.5 ATROPHY OF VULVA: ICD-10-CM

## 2023-06-20 PROCEDURE — G0101: CPT | Mod: GA

## 2023-06-20 PROCEDURE — G0328 FECAL BLOOD SCRN IMMUNOASSAY: CPT | Mod: GA,QW

## 2023-06-20 NOTE — HISTORY OF PRESENT ILLNESS
[Patient reported bone density results were normal] : Patient reported bone density results were normal [Patient reported colonoscopy was normal] : Patient reported colonoscopy was normal [FreeTextEntry1] : 2023. DOUGLAS LUNA 77 year old female  LMP 51 y/o. She presents for an annual gyn exam.\par \par She denies abdominal and pelvic pain. No abnormal vaginal bleeding, vaginal discharge, or vaginitis symptoms. She reports normal urination, no dysuria, no incontinence of urine. BM is normal per patient, no blood in stool or constipation/diarrhea.\par \par No new medical conditions, medications, or surgeries since last visit.\par \par Of note, pt's five grandchildren are doing well.\par \par GynHx atrophy, osteopenia\par MedHx reflux, depression\par SurgHx dermoid cyst, cholecystomy, vein ligation, retinal sx, cataracts, benign breast bx ()\par Allergic PCN, Sulfa [TextBox_4] : Mammo/breast sono 4/23 - BI-RADS0, bx recommended. Pathology benign.  [BoneDensityDate] : 04/23 [TextBox_37] : osteopenia, elevated hip frax [ColonoscopyDate] : 2017

## 2023-06-20 NOTE — PLAN
[FreeTextEntry1] : Routine Gyn Exam: atrophy, osteopenia SurgHx dermoid cyst, benign breast bx (5/23)\par BSE taught.\par Breast and pelvic examination performed.\par Rx given for mammogram and breast sonogram 4/24 (last 4/23)\par Advised pt. to schedule colonoscopy or Cologuard 2023 (last colonoscopy 2017)\par Last bone density done in 4/23, plan to f/u w/ rheumatologist\par \par Vaginal Atrophy:\par Advised OTC lubricants, coconut oil\par \par RTO in 1 year or PRN.

## 2024-01-08 ENCOUNTER — APPOINTMENT (OUTPATIENT)
Dept: ORTHOPEDIC SURGERY | Facility: CLINIC | Age: 79
End: 2024-01-08
Payer: MEDICARE

## 2024-01-08 ENCOUNTER — NON-APPOINTMENT (OUTPATIENT)
Age: 79
End: 2024-01-08

## 2024-01-08 VITALS — HEIGHT: 63 IN | BODY MASS INDEX: 26.22 KG/M2 | WEIGHT: 148 LBS

## 2024-01-08 DIAGNOSIS — M17.11 UNILATERAL PRIMARY OSTEOARTHRITIS, RIGHT KNEE: ICD-10-CM

## 2024-01-08 PROCEDURE — 73564 X-RAY EXAM KNEE 4 OR MORE: CPT | Mod: RT

## 2024-01-08 PROCEDURE — 99204 OFFICE O/P NEW MOD 45 MIN: CPT

## 2024-01-08 NOTE — PHYSICAL EXAM
[de-identified] : General appearance: well-nourished and hydrated, pleasant, alert and oriented x 3, cooperative. HEENT: Normocephalic, EOM intact, Nasal septum midline, Oral cavity clear, External auditory canal clear. Cardiovascular: varicose veins.  Lymphatics Lymph nodes: none palpated, Lymphedema: not present. Neurologic: sensation is normal, no muscle weakness in upper or lower extremities, patella tendon reflexes intact. Dermatologic no apparent skin lesions, moist, warm, no rash. Spine: cervical spine appears normal and moves freely, thoracic spine appears normal and moves freely, lumbosacral spine appears normal and moves freely. Gait: nonantalgic.   Left knee Inspection: no effusion or erythema. Wounds: none. Alignment: normal. Palpation: medial tenderness on palpation. ROM active (in degrees): 0-115 with crepitus  Ligamentous laxity: all ligaments appear stable, negative ant. drawer test, negative post. drawer test, stable to varus stress test, stable to valgus stress test. negative Lachman's test, negative pivot shift test Meniscal Test: negative McMurrays, negative Camilo. Patellofemoral Alignment Test: Q angle-, normal. Muscle Test: good quad strength. Leg examination: calf is soft and non-tender.   Right knee Inspection: mild effusion  Wounds: none. Alignment: normal. Palpation: medial tenderness on palpation. ROM active (in degrees): 0-100 with crepitus through arc motion Ligamentous laxity: all ligaments appear stable, negative ant. drawer test, negative post. drawer test, stable to varus stress test, stable to valgus stress test. negative Lachman's test, negative pivot shift test Meniscal Test: negative McMurrays, negative Camilo. Patellofemoral Alignment Test: Q angle-, normal. Muscle Test: good quad strength. Leg examination: calf is soft and non-tender.   Left hip Inspection: No swelling or ecchymosis. Wounds: none. Palpation: non-tender. Stability: no instability. Strength: 5/5 all motor groups. ROM: no pain with FROM. Leg length: equal.   Right hip Inspection: No swelling or ecchymosis. Wounds: none. Palpation: non-tender. Stability: no instability. Strength: 5/5 all motor groups. ROM: no pain with FROM. Leg length: equal.   Left ankle Inspection: no erythema noted, no swelling noted. Palpation: no pain on palpation. ROM: FROM without crepitus. Muscle strength: 5/5. Stability: no instability noted.   Right ankle Inspection: no erythema noted, no swelling noted. ROM: FROM without crepitus. Palpation: no pain on palpation. Muscle strength: 5/5. Stability: no instability noted.   Left foot Inspection: color, texture and turgor are normal. ROM: full range of motion of all joints without pain or crepitus. Palpation: no tenderness. Stability: no instability noted.   Right foot Inspection: color, texture and turgor are normal. ROM: full range of motion of all joints without pain or crepitus. Palpation: no tenderness. Stability: no instability noted.   Left shoulder Inspection: no muscle asymmetry, no atrophy. Palpation: no tenderness noted, ACJ non-tender. ROM: full active ROM, full passive ROM. Strength testing): anterior deltoid, supraspinatus, infraspinatus, subscapularis all 5/5. Stability test: ant. apprehension negative, post. apprehension negative, relocation test negative. Impingement Test: negative NEER.   Right shoulder Inspection: no muscle asymmetry, no atrophy. Palpation: no tenderness noted, ACJ non-tender. ROM: full active ROM, full passive ROM. Strength testing): anterior deltoid, supraspinatus, infraspinatus, subscapularis all 5/5. Stability test: ant. apprehension negative, post. apprehension negative, relocation test negative. Impingement Test: negative NEER. Surgical Wounds: none.   Left elbow Inspection: negative swelling. Wounds: none. Palpation: non-tender. ROM: full ROM. Strength: 5/5 all groups. Stability: no instability. Mass: none.   Right elbow Inspection: negative swelling. Wounds: none. Palpation: non-tender. ROM: full ROM. Strength: 5/5 all groups. Stability: no instability. Mass: none.   Left wrist Inspection: negative swelling. Wound: none. Palpation (bone): no tenderness. ROM: full ROM. Strength: full , good.   Right wrist Inspection: negative swelling. Wound: none. Palpation (bone): no tenderness. ROM: full ROM. Strength: full , good.   Left hand Inspection: no skin changes, normal appearance. Wounds: none. Strength: full , able to make full fist. Sensation: light touch intact all fingers and thumb. Vascular: good capillary refill < 3 seconds, all fingers and thumb. Mass: none.   Right hand Inspection: no skin changes, normal appearance. Wounds: none. Strength: full , able to make full fist. Sensation: light touch intact all fingers and thumb. Vascular: good capillary refill < 3 seconds, all fingers and thumb. Mass: none. [de-identified] : Right knee x-rays, standing AP/Lateral and Merchant films, and 45-degree PA standing view, taken at the office today shows diffuse tricompartmental degenerative arthritis, medial joint space narrowing, marginal osteophytes, bone on bone sclerosis, patella at appropriate height and central position, patellofemoral joint space narrowing, peripheral osteophytes, Kellgren and Jono grade 4.  Left knee x-ray merchant view taken at the office today demonstrates patellofemoral arthritis with joint space narrowing, osteophytes and a well centered patella.

## 2024-01-08 NOTE — HISTORY OF PRESENT ILLNESS
[de-identified] : DOUGLAS LUNA 78 year old female presents for initial evaluation of right knee pain that has been occurring for the past 5 years. She denies any injuries at the onset of her pain. Her pain has been getting progressively worse. Her pain began on the posterior aspect of her knee but is now on the anterior aspect of her leg. Her pain is more diffuse in nature. She use to walk daily and participate in exercises classes but can no longer do some. She likes to travel and talk long walks and hikes but is no longer able to do this because if her knee pain. Her knee pain is dull on the anterior and medial aspect of the knee without any associated mechanical symptoms. She can walk about 2 blocks. Navigating stairs is difficult especially going down. She takes Advil and Tylenol along with topical Voltaren gel and blue emu. She has had prior gel and steroid injections. Her last gel injection was in December with Dr. Watson who is her pain management specialist. She denies taking any opioids.   PM Hx: chronic back pain, depression, left tibia treated nonoperatively after a fall in 2022, left meniscal tears. She sees a rheumatologist, PCP, and gynecologist.  Allergies: sulfa and Penicillin cause hives.

## 2024-01-08 NOTE — ADDENDUM
[FreeTextEntry1] : This note was written by Zbigniew Burrell on 01/08/2024 acting as scribe for Dr. Delonte ADAMS I, Dr. Delonte Blackwood, have read and attest that all the information, medical decision making and discharge instructions within are true and accurate.   This note was written by Lenin Desir on 01/08/2024 acting as scribe for Dr. Delonte ADAMS I, Dr. Delonte Blackwood, have read and attest that all the information, medical decision making and discharge instructions within are true and accurate.

## 2024-01-08 NOTE — DISCUSSION/SUMMARY
[de-identified] : Discussed at length the natural history of right knee degenerative arthritis and reviewed non-operative and operative treatment. Prior non-operative treatment for more than 3 months by either myself or prior treating physicians, including NSAIDs, injection therapy, a structured exercise program or physiotherapy and weight management has not resolved the condition. Due to the pain and associated functional disability that impacts all appropriate activities of daily living, I recommend a RIGHT total knee replacement. A thorough discussion regarding the risks, benefits, convalescence and alternatives were reviewed. The risks can include but are not limited to anesthesia risk, bleeding wit possible transfusion, nerve injury, infection, revision surgery due to implant failure, bone fracture, deep vein thrombosis, cardiac failure, pneumonia, and respiratory distress. The patient's expectations were reviewed and compared to the surgeon's expectations. This allowed for a discussion regarding reasonable expectations for functional improvement, pain relief, and return to normal activities of daily living. Numerous questions were asked and answered to their satisfaction. The patient was involved in the decision-making process - we discussed implant choice and fixation along with all details of TKA. Models were used as an educational tool. Surgery will be scheduled at a convenient time. All questions were answered in detail.    Surgery will be scheduled at a convenient time.   Preop medical clearance.

## 2024-01-11 ENCOUNTER — APPOINTMENT (OUTPATIENT)
Dept: ORTHOPEDIC SURGERY | Facility: CLINIC | Age: 79
End: 2024-01-11
Payer: MEDICARE

## 2024-01-11 VITALS
HEIGHT: 63 IN | BODY MASS INDEX: 26.22 KG/M2 | WEIGHT: 148 LBS | SYSTOLIC BLOOD PRESSURE: 173 MMHG | DIASTOLIC BLOOD PRESSURE: 78 MMHG

## 2024-01-11 DIAGNOSIS — M17.11 UNILATERAL PRIMARY OSTEOARTHRITIS, RIGHT KNEE: ICD-10-CM

## 2024-01-11 PROCEDURE — 73564 X-RAY EXAM KNEE 4 OR MORE: CPT | Mod: RT

## 2024-01-11 PROCEDURE — 99215 OFFICE O/P EST HI 40 MIN: CPT

## 2024-01-11 NOTE — PHYSICAL EXAM
[de-identified] : The patient appears well nourished and in no apparent distress.  The patient is alert and oriented to person, place, and time.   Affect and mood appear normal. The head is normocephalic and atraumatic.  The eyes reveal normal sclera and extra ocular muscles are intact. The tongue is midline with no apparent lesions.  Skin shows normal turgor with no evidence of eczema or psoriasis.  No respiratory distress noted.  Sensation grossly intact.		  [de-identified] : Exam of the right knee shows a varus alignment which is correctable, 0 to 110 degrees of flexion measured with a goniometer.  5/5 motor strength bilaterally distally. Sensation intact distally.		  [de-identified] : X-ray: 4 views of the right knee demonstrate bone on bone arthritis.

## 2024-01-11 NOTE — DISCUSSION/SUMMARY
[de-identified] : DOUGLAS LUNA is a 78 year old female who presents with right knee bone on bone arthritis. The patient has exhausted a minimum of 3 months conservative treatment including prior injections, physical therapy, over the counter NSIADS and pain medication where indicated. In addition, the patient's quality of life is diminished due to significant chronic pain. The patient is having difficulty ambulating, descending stairs, and rising from a seated position.   Based upon the patient's continued symptoms and failure to respond to conservative treatment, I have recommended a right total knee replacement for this patient. A long discussion took place with the patient describing what a total joint replacement is and what the procedure would entail. A knee model, similar to the implants that will be used during the operation, was utilized to demonstrate the implants. Choices of implant manufactures were discussed and reviewed. The ability to secure the implant utilizing cement or cementless (press fit) fixation was discussed. The patient agrees with the plan of care as well as the use of implants.   The hospitalization and post-operative care and rehabilitation were also discussed. The use of perioperative antibiotics and DVT prophylaxis were discussed. The risk, benefits and alternatives to a surgical intervention were discussed at length with the patient. The patient was also advised of risks related to the medical comorbidities and elevated body mass index (BMI). A lengthy discussion took place to review the most common complications including but not limited to: deep vein thrombosis, pulmonary embolus, heart attack, stroke, infection, wound breakdown, numbness, damage to nerves, tendon, muscles, arteries or other blood vessels, death and other possible complications from anesthesia. The patient was told that we will take steps to minimize these risks by using sterile technique, antibiotics and DVT prophylaxis when appropriate and follow the patient postoperatively in the office setting to monitor progress. The possibility of recurrent pain, no improvement in pain and actual worsening of pain were also discussed with the patient.   The discharge plan of care focused on the patient going home following surgery. The patient was encouraged to make the necessary arrangements to have someone stay with them when they are discharged home. Following discharge, a home care nurse will visit the patient. The home care nurse will open your home care case and request home physical therapy services. Home physical therapy will commence following discharge provided it is appropriate and covered by the health insurance benefit plan.   The benefits of surgery were discussed with the patient including the potential for improving the patient's current clinical condition through operative intervention. Alternatives to surgical intervention including continued conservative management were also discussed in detail. All questions were answered to the satisfaction of the patient. The treatment plan of care, as well as a model of a total knee equivalent to the one that will be used for their total joint replacement, was shared with the patient. The patient participated and agreed to the plan of care as well as the use of the recommended implants for their total joint replacement surgery.   We discussed that the knee replacement will be done with robotic assistance to enhance accuracy and dynamic joint balancing.

## 2024-01-11 NOTE — CONSULT LETTER
[Dear  ___] : Dear  [unfilled], [Consult Letter:] : I had the pleasure of evaluating your patient, [unfilled]. [Please see my note below.] : Please see my note below. [Consult Closing:] : Thank you very much for allowing me to participate in the care of this patient.  If you have any questions, please do not hesitate to contact me. [Sincerely,] : Sincerely, [FreeTextEntry2] : SONAM MONZON MD [FreeTextEntry3] : Percy Valentino MD Chief of Joint Replacement Primary & Revision Hip and Knee Replacement  [facility]

## 2024-01-11 NOTE — ADDENDUM
[FreeTextEntry1] : This note was authored by Michael Lyman working as a medical scribe for Dr. Percy Valentino. The note was reviewed, edited, and revised by Dr. Percy Valentino whom is in agreement with the exam findings, imaging findings, and treatment plan. 01/11/2024

## 2024-01-11 NOTE — HISTORY OF PRESENT ILLNESS
[de-identified] : Ms. DOUGLAS LUNA is a 78 year old female presenting for evaluation of longstanding right knee pain. Her knee pain is localized medially and is worse with all weightbearing activity as well as deep flexion. Patient has tried PT and NSAIDs without improvement. A few months ago she had a steroid injection without improvement. She then had a gel series ending in mid/late December without improvement. Patient is hopeful to discuss TKR.

## 2024-02-27 ENCOUNTER — APPOINTMENT (OUTPATIENT)
Dept: ORTHOPEDIC SURGERY | Facility: HOSPITAL | Age: 79
End: 2024-02-27

## 2024-03-20 ENCOUNTER — APPOINTMENT (OUTPATIENT)
Dept: ORTHOPEDIC SURGERY | Facility: CLINIC | Age: 79
End: 2024-03-20

## 2025-06-25 NOTE — ED PROVIDER NOTE - CROS ED NEURO ALL NEG
Call placed per ED Navigator to f/u from last encounter. No answer. V/m left. ED navigator will follow-up with patient and assist.     
- - -